# Patient Record
Sex: FEMALE | Race: BLACK OR AFRICAN AMERICAN | Employment: OTHER
[De-identification: names, ages, dates, MRNs, and addresses within clinical notes are randomized per-mention and may not be internally consistent; named-entity substitution may affect disease eponyms.]

---

## 2020-07-15 ENCOUNTER — NURSE TRIAGE (OUTPATIENT)
Dept: OTHER | Facility: CLINIC | Age: 80
End: 2020-07-15

## 2020-07-15 NOTE — TELEPHONE ENCOUNTER
Pt calling for MMO benefit. Caller is pt's son. He states pt's weight yesterday was 122.2, today it is 123.2. Pt then instructs patient that \"you wanted us to watch her weights. \" Pt is trying to reach health , does not want triage. Directed pt to correct MMO place. Please do not respond to the triage nurse through this encounter. Any subsequent communication should be directly with the patient.     Reason for Disposition   Information only question and nurse able to answer    Protocols used: INFORMATION ONLY CALL - NO TRIAGE-ADULT-[place]

## 2023-03-14 ENCOUNTER — NURSING HOME VISIT (OUTPATIENT)
Dept: POST ACUTE CARE | Facility: EXTERNAL LOCATION | Age: 83
End: 2023-03-14
Payer: MEDICARE

## 2023-03-14 DIAGNOSIS — N39.0 URINARY TRACT INFECTION WITHOUT HEMATURIA, SITE UNSPECIFIED: ICD-10-CM

## 2023-03-14 DIAGNOSIS — R53.81 DEBILITY: Primary | ICD-10-CM

## 2023-03-14 DIAGNOSIS — R41.89 COGNITIVE DECLINE: ICD-10-CM

## 2023-03-14 PROCEDURE — G0317 PROLONG NURSING FAC EVAL 15M: HCPCS | Performed by: FAMILY MEDICINE

## 2023-03-14 PROCEDURE — 99310 SBSQ NF CARE HIGH MDM 45: CPT | Performed by: FAMILY MEDICINE

## 2023-03-14 NOTE — LETTER
Patient: Yessi Knight  : 1940    Encounter Date: 2023    Chief Complaint  Follow p on new admission  Subjective  HPI:  82-year-old female with a history of HFpEF, AICD/pacemaker placement, CAD, CKD 3B, CVA and recurrent TIAs admitted to SNF after hospital stay for cognitive and functional decline.  Per hospital record patient presented to the ER due to concern for progressively worsening aphasia.  Patient unable to provide any history however per son who over the phone stated she has had progressively worsening speech over the last couple days.  He was concerned that she may be having a stroke prompting him to call 911.  He reports that she lives with him and that she has very limited mobility and is almost fully dependent for ADLs.  She does have home health care that visits daily.  Per chart review patient's son has a history of disability (details are unclear), he walks with a walker although he is able to prepare food for the patient and himself.  Per discussion with the patient's daughter who is POA she was unaware that the patient was in the ER until she spoke with the ER .  She has been working on getting patient placed in a long-term care facility due to significantly difficulties caring for her at home.  Reportedly patient's home health agencies or family friends were helping out, not a professional home health agency.  A BAT was called in ER due to patient concerns however per neurology evaluation there was no concern for acute stroke and CT head was negative for acute changes.  Due to the patient's significant cognitive and mobility impairments and concern for level of care she is that she is able to receive at home she was admitted for PT OT evaluation and possible placement.  Hospital course SLP consulted for concerns over aspiration/dysphagia. MBSS showed some esophageal deficits resulting in a retrograde flow of all textures.  SLP recommended puréed diet with thin liquids  also recommend double swallowing with every bite/sip.  Nutrition consulted and recommended adding Ensure drinks and pudding for supplements.  While inpatient, patient developed a UTI with RAINA with a creatinine of 1.9, poor urine output and urine lites suggestive intrinsic cause (fena 1.4%).  Due to her dementia, patient was not able to clarify any symptoms except for mild abdominal pain which resolved.  She was treated with IV hydration and started IV ceftriaxone 3/7 through 3/10, while awaiting urine culture results but it showed mixed growth x2 therefore switched to p.o. cefuroxime.  She was seen by family medicine who deemed her suitable for discharge.   3/14  Today patient is lying in bed, looking up at ceiling.  Does not make eye contact.  She did eventually answer to her name, she denied pain.  Per PCC appetite is fair to poor, 25-50%.   Review of Systems:  Reviewed chart looking at current medications, treatment, labs and x-rays and note pertinent positives or negatives, otherwise 10 point system review negative except for what is noted in HPI.  Objective  VS: 169/92, 97,0, 70, 18  Physical exam:  Awake, NAD  Eyes: conjunctiva non-icteric and eye lids are without obvious rash or drooping. Pupils are symmetric.   Ears, Nose, Mouth, and Throat: External ears and nose appear to be without deformity or rash. No lesions or masses noted. Hearing is grossly intact.   CV: RRR, no murmur  Carotids: no bruits  Pulm:CTA B/L  Abd: soft, + BS  LE: no edema  Psychiatric: Alert confused.. Mood and affect flat. + dementia  Assessment/Plan   82-year-old female with a history of HFpEF, AICD/pacemaker placement, CAD, CKD 3B, CVA and recurrent TIAs admitted to SNF after hospital stay for cognitive and functional decline/UTI/budding yeast in urine.  * Physical debility/Cognitive decline, aphasia/dementia- PT/OT consulting and assessing function.   * Dementia- ST/PT/OT  * UTI- on Cefuroxime Axetil ATB and fluconazole  for budding  yeast in urine. Discontinue Cefuroxime on 3/16 and stop date for  today 3/14.    * HTN/CAD/CKD III/HLD- c/w isosorbide, Coreg, hydralazine, ASA, torsemide, atorvastatin.  Monitor labs.  See abnormal labs below.  * Leukopenia- repeat labs next week  *  CVA- right hand contracted  c/w ASA and atorvastatin    Abnormal labs: WBC- 2.66, H&H- 9.6/31.9, BUN- 22/1.35, Ca-10.5      Electronically Signed By: KJ Jones-CNP   3/19/23  5:06 PM

## 2023-03-15 ENCOUNTER — NURSING HOME VISIT (OUTPATIENT)
Dept: POST ACUTE CARE | Facility: EXTERNAL LOCATION | Age: 83
End: 2023-03-15
Payer: MEDICARE

## 2023-03-15 DIAGNOSIS — I50.32 CHF (CONGESTIVE HEART FAILURE), NYHA CLASS II, CHRONIC, DIASTOLIC (MULTI): ICD-10-CM

## 2023-03-15 DIAGNOSIS — R62.7 ADULT FAILURE TO THRIVE: ICD-10-CM

## 2023-03-15 DIAGNOSIS — I69.391 DYSPHAGIA AS LATE EFFECT OF CEREBROVASCULAR ACCIDENT (CVA): ICD-10-CM

## 2023-03-15 DIAGNOSIS — I63.421 CEREBROVASCULAR ACCIDENT (CVA) DUE TO EMBOLISM OF RIGHT ANTERIOR CEREBRAL ARTERY (MULTI): ICD-10-CM

## 2023-03-15 DIAGNOSIS — F33.1 MODERATE EPISODE OF RECURRENT MAJOR DEPRESSIVE DISORDER (MULTI): ICD-10-CM

## 2023-03-15 DIAGNOSIS — I49.5 SINOATRIAL NODE DYSFUNCTION (MULTI): ICD-10-CM

## 2023-03-15 DIAGNOSIS — Z78.9 IMPAIRED MOBILITY AND ADLS: ICD-10-CM

## 2023-03-15 DIAGNOSIS — I69.320 APHASIA AS LATE EFFECT OF CEREBROVASCULAR ACCIDENT: ICD-10-CM

## 2023-03-15 DIAGNOSIS — R41.89 COGNITIVE DEFICIT WITH IMPAIRED PSYCHOMOTOR FUNCTION: ICD-10-CM

## 2023-03-15 DIAGNOSIS — I70.1 RENAL ARTERY STENOSIS (CMS-HCC): ICD-10-CM

## 2023-03-15 DIAGNOSIS — N18.32: ICD-10-CM

## 2023-03-15 DIAGNOSIS — I13.0: ICD-10-CM

## 2023-03-15 DIAGNOSIS — Z74.09 IMPAIRED MOBILITY AND ADLS: ICD-10-CM

## 2023-03-15 DIAGNOSIS — N30.00 ACUTE CYSTITIS WITHOUT HEMATURIA: ICD-10-CM

## 2023-03-15 DIAGNOSIS — G45.9 TIA (TRANSIENT ISCHEMIC ATTACK): Primary | ICD-10-CM

## 2023-03-15 DIAGNOSIS — I50.33: ICD-10-CM

## 2023-03-15 DIAGNOSIS — R41.843 COGNITIVE DEFICIT WITH IMPAIRED PSYCHOMOTOR FUNCTION: ICD-10-CM

## 2023-03-15 PROCEDURE — 99306 1ST NF CARE HIGH MDM 50: CPT | Performed by: FAMILY MEDICINE

## 2023-03-15 NOTE — LETTER
Patient: Scarlett Osborne  : 1940    Encounter Date: 03/15/2023    Chief Complaint/HPI:SCARLETT OSBORNE is a 82 year old Female with a history of HFpEF,   AICD/pacemaker placement, CAD, CKD IIIb, CVA and recurrent TIAs who presented   to the ER due to concern for progressively worsening aphasia. Patient is unable   to provide any historyt she has very limited mobility, is almost fully   dependent for ADLs. She does have home health care that visits daily.      Daughter has been working on getting patient placed in a long term care facility due   to significantly difficulties caring for her at home. Reportedly patient's home   health care are family friends who are helping out, not a professional home   health agency.       A BAT was called in the ER due to aphasia concerns however per neurology   evaluation there was no concern for acute stroke and CT head was negative for   acute changes. Due to patient's significant cognitive and mobility impairments   and concerns about level of care that she is able to receive at home she was   admitted for PT/OT evaluation and possible placement.        SLP consulted for concerns over aspiration/dysphagia. MBSS   showed some esophageal deficits resulting in retrograde flow of all textures,   recommended double swallow with every bite/sip. SLP recommended pureed diet   with thin liquid. Some esophageal deficits resulting in retrograde flow of all   textures, recommended double swallow with every bite/sip. Nutrition consulted,   recommended adding ensure drink and pudding for supplements. While inpatient,   pt developed UTI with RAINA (Cr 1.9) /poor urine output, urine lytes suggestive   of intrinsic cause (FeNA 1.4%), Due to her dementia, pt was not able to clarify   any symptoms except for mild abdo pain which resolved. She was treated with IV   hydration and started IV CTX (3/7-3/10) while awaiting urine cx result but it   showed mixed growth x2, therefore, switched to PO  cefuroxime 250mg BID (3/11-   3/16) for total of 10days. Also added PO diflucan 100mg due to positive UA for   budding yeast for total of 7days (3/8 - 3/14).     Today patient is not able to provide history.          ROS otherwise negative aside from what was mentioned above in HPI.      Patient Active Problem List   Diagnosis   • Moderate episode of recurrent major depressive disorder (CMS/HCC)   • Renal artery stenosis (CMS/HCC)   • Sinoatrial node dysfunction (CMS/HCC)   • Cerebrovascular accident (CVA) due to embolism of right anterior cerebral artery (CMS/HCC)   • Dysphagia as late effect of cerebrovascular accident (CVA)   • TIA (transient ischemic attack)   • Impaired mobility and ADLs   • Acute cystitis without hematuria   • Aphasia as late effect of cerebrovascular accident   • Adult failure to thrive   • Cognitive deficit with impaired psychomotor function   • 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 congestive heart failure and stage 3b chronic kidney disease (CMS/HCC)   • CHF (congestive heart failure), NYHA class II, chronic, diastolic (CMS/HCC)     Past Medical History:   Diagnosis Date   • Chronic kidney disease, stage 3a 07/23/2020    Stage 3a chronic kidney disease   • Chronic rhinitis 01/28/2019    Rhinitis   • Encounter for screening for malignant neoplasm of colon 07/23/2020    Colon cancer screening   • Major depressive disorder, recurrent, unspecified (CMS/HCC) 03/12/2018    Recurrent major depression   • Personal history of benign neoplasm of the brain 03/12/2018    History of benign neoplasm of cerebral meninges   • Personal history of other diseases of the circulatory system 08/20/2019    History of cardiac murmur   • Personal history of other diseases of the digestive system 02/13/2020    History of rectal bleeding   • Personal history of other diseases of the musculoskeletal system and connective tissue 03/12/2018    History of gout   • Personal history of  other diseases of the musculoskeletal system and connective tissue     History of osteopenia   • Personal history of other diseases of the nervous system and sense organs 07/08/2022    History of bacterial conjunctivitis   • Personal history of other diseases of urinary system 03/12/2018    History of renal insufficiency syndrome   • Personal history of other malignant neoplasm of large intestine 03/12/2018    History of malignant neoplasm of colon   • Personal history of other specified conditions 02/15/2022    History of chronic cough   • Personal history of other specified conditions 05/04/2020    History of chronic fatigue   • Personal history of other specified conditions 12/03/2019    History of abnormal weight loss   • Personal history of other specified conditions 02/13/2020    History of abdominal pain   • Presence of dental prosthetic device (complete) (partial) 07/10/2019    Dental root implant present   • Tobacco abuse counseling 01/28/2019    Encounter for smoking cessation counseling   • Unspecified systolic (congestive) heart failure (CMS/HCC) 11/26/2019    Systolic heart failure     Past Surgical History:   Procedure Laterality Date   • OTHER SURGICAL HISTORY  01/28/2019    Colon surgery     No family history on file.         ALLERGIES  Not on File      MEDICATIONS  Current Outpatient Medications   Medication Sig Dispense Refill   • atorvastatin (Lipitor) 80 mg tablet Take 1 tablet (80 mg) by mouth once daily.     • carvedilol (Coreg) 25 mg tablet Take 1 tablet (25 mg) by mouth in the morning and 1 tablet (25 mg) before bedtime.     • folic acid (Folvite) 1 mg tablet Take 1 tablet (1 mg) by mouth once daily.     • hydrALAZINE (Apresoline) 10 mg tablet Take 1 tablet (10 mg) by mouth in the morning and 1 tablet (10 mg) in the evening and 1 tablet (10 mg) before bedtime.     • aspirin 81 mg chewable tablet Chew 1 tablet (81 mg) once daily.     • cholecalciferol (Vitamin D-3) 25 MCG (1000 UT) capsule  Take by mouth.     • cyanocobalamin (Vitamin B-12) 1,000 mcg tablet Take 1 tablet (1,000 mcg) by mouth once daily.     • iron polysaccharide-B12-folic acid (Poly-Iron 150 Forte) 150-25-1 mg-mcg-mg capsule Take by mouth.     • sodium bicarbonate 650 mg tablet Take by mouth.       No current facility-administered medications for this visit.         PHYSICAL EXAM  There were no vitals taken for this visit.  .FLOWAMB[11   .FLOWAMB[14   There is no height or weight on file to calculate BMI.  Gen: Alert to self , NAD  HEENT:  PERRLA, EOMI, conjunctiva and sclera normal in appearance  Respiratory:  Lungs CTAB  Cardiovascular:  AICD No M/R/G  Neuro:  Gross motor and sensory intact  Skin:  No suspicious lesions present    ASSESSMENT/PLAN  Problem List Items Addressed This Visit          Nervous    Cerebrovascular accident (CVA) due to embolism of right anterior cerebral artery (CMS/HCC)    Overview     H/o worsening. C/w statin and asa.          Current Assessment & Plan     Continiue ASA Statin BP control supportive care Worsening Aphasia and dysphagia         Aphasia as late effect of cerebrovascular accident    Current Assessment & Plan     ST consult            Circulatory    Renal artery stenosis (CMS/HCC)    Current Assessment & Plan     Hydralazine PRN for uncontrolled HTN         Sinoatrial node dysfunction (CMS/HCC)    Current Assessment & Plan     AICD in place/ follow up with cardiology         Relevant Medications    carvedilol (Coreg) 25 mg tablet    TIA (transient ischemic attack) - Primary    Current Assessment & Plan     C/w ASA statin. No new stroke on CT . 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 Weekly labs.          Relevant Medications    carvedilol (Coreg) 25 mg tablet    CHF (congestive heart failure), NYHA  class II, chronic, diastolic (CMS/HCC)    Current Assessment & Plan     C/w Control BP. Monitor weight weekly labs         Relevant Medications    carvedilol (Coreg) 25 mg tablet       Digestive    Dysphagia as late effect of cerebrovascular accident (CVA)    Current Assessment & Plan     ST consult            Genitourinary    Acute cystitis without hematuria    Current Assessment & Plan     Complete ATB as ordered            Endocrine/Metabolic    Adult failure to thrive    Current Assessment & Plan     Appetite stimulant may be needed as per nutritionist assessment             Other    Moderate episode of recurrent major depressive disorder (CMS/HCC)    Current Assessment & Plan     Currently not on meds.Evaluate over the next few days. Consult psych and start Meds if needed         Impaired mobility and ADLs    Current Assessment & Plan     OT/PT/ST consult moderate intesity therapy 6 times a week.          Cognitive deficit with impaired psychomotor function    Current Assessment & Plan     Supportive care                Vik Montoya MD      Electronically Signed By: Vik Montoya MD   3/23/23  4:06 PM

## 2023-03-19 NOTE — PROGRESS NOTES
Chief Complaint  Follow p on new admission  Subjective  HPI:  82-year-old female with a history of HFpEF, AICD/pacemaker placement, CAD, CKD 3B, CVA and recurrent TIAs admitted to SNF after hospital stay for cognitive and functional decline.  Per hospital record patient presented to the ER due to concern for progressively worsening aphasia.  Patient unable to provide any history however per son who over the phone stated she has had progressively worsening speech over the last couple days.  He was concerned that she may be having a stroke prompting him to call 911.  He reports that she lives with him and that she has very limited mobility and is almost fully dependent for ADLs.  She does have home health care that visits daily.  Per chart review patient's son has a history of disability (details are unclear), he walks with a walker although he is able to prepare food for the patient and himself.  Per discussion with the patient's daughter who is POA she was unaware that the patient was in the ER until she spoke with the ER .  She has been working on getting patient placed in a long-term care facility due to significantly difficulties caring for her at home.  Reportedly patient's home health agencies or family friends were helping out, not a professional home health agency.  A BAT was called in ER due to patient concerns however per neurology evaluation there was no concern for acute stroke and CT head was negative for acute changes.  Due to the patient's significant cognitive and mobility impairments and concern for level of care she is that she is able to receive at home she was admitted for PT OT evaluation and possible placement.  Hospital course SLP consulted for concerns over aspiration/dysphagia. MBSS showed some esophageal deficits resulting in a retrograde flow of all textures.  SLP recommended puréed diet with thin liquids also recommend double swallowing with every bite/sip.  Nutrition  consulted and recommended adding Ensure drinks and pudding for supplements.  While inpatient, patient developed a UTI with RAINA with a creatinine of 1.9, poor urine output and urine lites suggestive intrinsic cause (fena 1.4%).  Due to her dementia, patient was not able to clarify any symptoms except for mild abdominal pain which resolved.  She was treated with IV hydration and started IV ceftriaxone 3/7 through 3/10, while awaiting urine culture results but it showed mixed growth x2 therefore switched to p.o. cefuroxime.  She was seen by family medicine who deemed her suitable for discharge.   3/14  Today patient is lying in bed, looking up at ceiling.  Does not make eye contact.  She did eventually answer to her name, she denied pain.  Per PCC appetite is fair to poor, 25-50%.   Review of Systems:  Reviewed chart looking at current medications, treatment, labs and x-rays and note pertinent positives or negatives, otherwise 10 point system review negative except for what is noted in HPI.  Objective  VS: 169/92, 97,0, 70, 18  Physical exam:  Awake, NAD  Eyes: conjunctiva non-icteric and eye lids are without obvious rash or drooping. Pupils are symmetric.   Ears, Nose, Mouth, and Throat: External ears and nose appear to be without deformity or rash. No lesions or masses noted. Hearing is grossly intact.   CV: RRR, no murmur  Carotids: no bruits  Pulm:CTA B/L  Abd: soft, + BS  LE: no edema  Psychiatric: Alert confused.. Mood and affect flat. + dementia  Assessment/Plan   82-year-old female with a history of HFpEF, AICD/pacemaker placement, CAD, CKD 3B, CVA and recurrent TIAs admitted to SNF after hospital stay for cognitive and functional decline/UTI/budding yeast in urine.  * Physical debility/Cognitive decline, aphasia/dementia- PT/OT consulting and assessing function.   * Dementia- ST/PT/OT  * UTI- on Cefuroxime Axetil ATB and fluconazole  for budding yeast in urine. Discontinue Cefuroxime on 3/16 and stop date for   today 3/14.    * HTN/CAD/CKD III/HLD- c/w isosorbide, Coreg, hydralazine, ASA, torsemide, atorvastatin.  Monitor labs.  See abnormal labs below.  * Leukopenia- repeat labs next week  *  CVA- right hand contracted  c/w ASA and atorvastatin    Abnormal labs: WBC- 2.66, H&H- 9.6/31.9, BUN- 22/1.35, Ca-10.5

## 2023-03-21 ENCOUNTER — NURSING HOME VISIT (OUTPATIENT)
Dept: POST ACUTE CARE | Facility: EXTERNAL LOCATION | Age: 83
End: 2023-03-21
Payer: MEDICARE

## 2023-03-21 DIAGNOSIS — R41.843 COGNITIVE DEFICIT WITH IMPAIRED PSYCHOMOTOR FUNCTION: ICD-10-CM

## 2023-03-21 DIAGNOSIS — R53.81 DEBILITY: Primary | ICD-10-CM

## 2023-03-21 DIAGNOSIS — R41.89 COGNITIVE DECLINE: ICD-10-CM

## 2023-03-21 DIAGNOSIS — R41.89 COGNITIVE DEFICIT WITH IMPAIRED PSYCHOMOTOR FUNCTION: ICD-10-CM

## 2023-03-21 DIAGNOSIS — R62.7 ADULT FAILURE TO THRIVE: ICD-10-CM

## 2023-03-21 DIAGNOSIS — G45.9 TIA (TRANSIENT ISCHEMIC ATTACK): ICD-10-CM

## 2023-03-21 PROCEDURE — 99309 SBSQ NF CARE MODERATE MDM 30: CPT | Performed by: FAMILY MEDICINE

## 2023-03-21 NOTE — LETTER
Patient: Yessi Knight  : 1940    Encounter Date: 2023    Nursing Home Visit  Name: Yessi Knight  YOB: 1940  MRN: [id number]    Chief Complaint  Lab Review  Subjective  HPI:   82-year-old female with a history of HFpEF, AICD/pacemaker placement, CAD, CKD 3B, CVA and recurrent TIAs admitted to SNF after hospital stay for cognitive and functional decline.  Per hospital record patient presented to the ER due to concern for progressively worsening aphasia.  Patient unable to provide any history however per son who over the phone stated she has had progressively worsening speech over the last couple days.  He was concerned that she may be having a stroke prompting him to call 911.  He reports that she lives with him and that she has very limited mobility and is almost fully dependent for ADLs.  She does have home health care that visits daily.  Per chart review patient's son has a history of disability (details are unclear), he walks with a walker although he is able to prepare food for the patient and himself.  Per discussion with the patient's daughter who is POA she was unaware that the patient was in the ER until she spoke with the ER .  She has been working on getting patient placed in a long-term care facility due to significantly difficulties caring for her at home.  Reportedly patient's home health agencies or family friends were helping out, not a professional home health agency.  A BAT was called in ER due to patient concerns however per neurology evaluation there was no concern for acute stroke and CT head was negative for acute changes.  Due to the patient's significant cognitive and mobility impairments and concern for level of care she is that she is able to receive at home she was admitted for PT OT evaluation and possible placement.  Hospital course SLP consulted for concerns over aspiration/dysphagia. MBSS showed some esophageal deficits resulting in a  retrograde flow of all textures.  SLP recommended puréed diet with thin liquids also recommend double swallowing with every bite/sip.  Nutrition consulted and recommended adding Ensure drinks and pudding for supplements.  While inpatient, patient developed a UTI with RAINA with a creatinine of 1.9, poor urine output and urine lites suggestive intrinsic cause (fena 1.4%).  Due to her dementia, patient was not able to clarify any symptoms except for mild abdominal pain which resolved.  She was treated with IV hydration and started IV ceftriaxone 3/7 through 3/10, while awaiting urine culture results but it showed mixed growth x2 therefore switched to p.o. cefuroxime.  She was seen by family medicine who deemed her suitable for discharge.   3/14  Today patient is lying in bed, looking up at ceiling.  Does not make eye contact.  She did eventually answer to her name, she denied pain.  Per PCC appetite is fair to poor, 25-50%.   3/21  Patient sitting on side of bed for breakfast.  She still syed her feet on the floor, upper 1/2 of her body is laying across the bed.  Is not speaking at this moment.   Review of Systems:  Reviewed chart looking at current medications, treatment, labs and x-rays and note pertinent positives or negatives, otherwise 10 point system review negative except for what is noted in HPI.    Objective  VS: /76   Pulse 72   Temp 36.6 °C (97.8 °F)   Resp 18   Wt 49.9 kg (110 lb)   SpO2 95%   BMI 22.22 kg/m²     Physical exam:   Physical Exam   Awake, NAD  Eyes: conjunctiva non-icteric and eye lids are without obvious rash or drooping. Pupils are symmetric.   Ears, Nose, Mouth, and Throat: External ears and nose appear to be without deformity or rash. No lesions or masses noted. Hearing is grossly intact.   CV: RRR, no murmur  Carotids: no bruits  Pulm:CTA B/L  Abd: soft, + BS  LE: no edema  Psychiatric: Alert confused.. Mood and affect flat. + dementia  Assessment/Plan   82-year-old female with  a history of HFpEF, AICD/pacemaker placement, CAD, CKD 3B, CVA and recurrent TIAs admitted to SNF after hospital stay for cognitive and functional decline/UTI/budding yeast in urine.  * Physical debility/Cognitive decline, aphasia/dementia- PT/OT consulting and assessing function.   * Dementia/Cognitive decline/ failure to thrive- ST/PT/OT  * UTI- ATB and antifungal completed  * HTN/CAD/CKD III/HLD- Cr is trending up, monitor, encourage fluids, c/w isosorbide, Coreg, hydralazine, ASA, torsemide, atorvastatin.  Monitor labs.  See abnormal labs below.  * Leukopenia- repeat labs next week  *  TIA/CVA hx- right hand contracted  c/w ASA and atorvastatin  Abnormal labs:  Ca+- 10.8, Chl- 108, BUN- 26 Cr- 1.49, RBCs- 3.54, HGB- 9.7, HCT 33.3, WBCs- 3.05,       Electronically Signed By: KJ Jones-CNP   3/27/23  4:55 PM

## 2023-03-23 ENCOUNTER — NURSING HOME VISIT (OUTPATIENT)
Dept: POST ACUTE CARE | Facility: EXTERNAL LOCATION | Age: 83
End: 2023-03-23
Payer: MEDICARE

## 2023-03-23 VITALS — DIASTOLIC BLOOD PRESSURE: 78 MMHG | HEART RATE: 64 BPM | RESPIRATION RATE: 16 BRPM | SYSTOLIC BLOOD PRESSURE: 154 MMHG

## 2023-03-23 DIAGNOSIS — R41.89 COGNITIVE DECLINE: ICD-10-CM

## 2023-03-23 DIAGNOSIS — I49.5 SINOATRIAL NODE DYSFUNCTION (MULTI): ICD-10-CM

## 2023-03-23 DIAGNOSIS — I69.391 DYSPHAGIA AS LATE EFFECT OF CEREBROVASCULAR ACCIDENT (CVA): ICD-10-CM

## 2023-03-23 DIAGNOSIS — E53.8 VITAMIN B12 DEFICIENCY: ICD-10-CM

## 2023-03-23 DIAGNOSIS — G45.9 TIA (TRANSIENT ISCHEMIC ATTACK): ICD-10-CM

## 2023-03-23 DIAGNOSIS — I50.32 CHF (CONGESTIVE HEART FAILURE), NYHA CLASS II, CHRONIC, DIASTOLIC (MULTI): ICD-10-CM

## 2023-03-23 DIAGNOSIS — E53.8 FOLIC ACID DEFICIENCY: ICD-10-CM

## 2023-03-23 DIAGNOSIS — N18.4 STAGE 4 CHRONIC KIDNEY DISEASE (MULTI): ICD-10-CM

## 2023-03-23 DIAGNOSIS — R62.7 FAILURE TO THRIVE IN ADULT: ICD-10-CM

## 2023-03-23 DIAGNOSIS — R62.7 ADULT FAILURE TO THRIVE: Primary | ICD-10-CM

## 2023-03-23 DIAGNOSIS — I50.22 CHRONIC SYSTOLIC HEART FAILURE (MULTI): ICD-10-CM

## 2023-03-23 DIAGNOSIS — I63.421 CEREBROVASCULAR ACCIDENT (CVA) DUE TO EMBOLISM OF RIGHT ANTERIOR CEREBRAL ARTERY (MULTI): ICD-10-CM

## 2023-03-23 DIAGNOSIS — D50.9 IRON DEFICIENCY ANEMIA, UNSPECIFIED IRON DEFICIENCY ANEMIA TYPE: ICD-10-CM

## 2023-03-23 DIAGNOSIS — E55.9 VITAMIN D DEFICIENCY: ICD-10-CM

## 2023-03-23 DIAGNOSIS — E78.5 HYPERLIPIDEMIA, UNSPECIFIED HYPERLIPIDEMIA TYPE: ICD-10-CM

## 2023-03-23 DIAGNOSIS — I22.2 SUBSEQUENT NON-ST ELEVATION (NSTEMI) MYOCARDIAL INFARCTION (MULTI): ICD-10-CM

## 2023-03-23 DIAGNOSIS — I69.320 APHASIA AS LATE EFFECT OF CEREBROVASCULAR ACCIDENT: ICD-10-CM

## 2023-03-23 PROBLEM — Z78.9 IMPAIRED MOBILITY AND ADLS: Status: ACTIVE | Noted: 2023-03-23

## 2023-03-23 PROBLEM — I70.1 RENAL ARTERY STENOSIS (CMS-HCC): Status: ACTIVE | Noted: 2023-03-23

## 2023-03-23 PROBLEM — I13.0: Status: ACTIVE | Noted: 2023-03-23

## 2023-03-23 PROBLEM — I50.33: Status: ACTIVE | Noted: 2023-03-23

## 2023-03-23 PROBLEM — N30.00 ACUTE CYSTITIS WITHOUT HEMATURIA: Status: ACTIVE | Noted: 2023-03-23

## 2023-03-23 PROBLEM — R41.843 COGNITIVE DEFICIT WITH IMPAIRED PSYCHOMOTOR FUNCTION: Status: ACTIVE | Noted: 2023-03-23

## 2023-03-23 PROBLEM — N18.32: Status: ACTIVE | Noted: 2023-03-23

## 2023-03-23 PROBLEM — Z74.09 IMPAIRED MOBILITY AND ADLS: Status: ACTIVE | Noted: 2023-03-23

## 2023-03-23 PROBLEM — F33.1 MODERATE EPISODE OF RECURRENT MAJOR DEPRESSIVE DISORDER (MULTI): Status: ACTIVE | Noted: 2023-03-23

## 2023-03-23 PROCEDURE — 99309 SBSQ NF CARE MODERATE MDM 30: CPT | Performed by: FAMILY MEDICINE

## 2023-03-23 RX ORDER — HYDRALAZINE HYDROCHLORIDE 10 MG/1
1 TABLET, FILM COATED ORAL 3 TIMES DAILY
COMMUNITY
Start: 2020-10-15

## 2023-03-23 RX ORDER — SODIUM BICARBONATE 650 MG/1
TABLET ORAL
COMMUNITY

## 2023-03-23 RX ORDER — NAPROXEN SODIUM 220 MG/1
1 TABLET, FILM COATED ORAL DAILY
COMMUNITY
End: 2023-04-01 | Stop reason: SDUPTHER

## 2023-03-23 RX ORDER — CARVEDILOL 25 MG/1
1 TABLET ORAL 2 TIMES DAILY
COMMUNITY
Start: 2019-10-31 | End: 2023-04-01 | Stop reason: SDUPTHER

## 2023-03-23 RX ORDER — FOLIC ACID 1 MG/1
1 TABLET ORAL DAILY
COMMUNITY
Start: 2019-07-19

## 2023-03-23 RX ORDER — LANOLIN ALCOHOL/MO/W.PET/CERES
1 CREAM (GRAM) TOPICAL DAILY
COMMUNITY
End: 2023-04-01 | Stop reason: SDUPTHER

## 2023-03-23 RX ORDER — VIT C/E/ZN/COPPR/LUTEIN/ZEAXAN 250MG-90MG
CAPSULE ORAL
COMMUNITY
End: 2023-04-01 | Stop reason: SDUPTHER

## 2023-03-23 RX ORDER — IRON PS COMPLEX/B12/FOLIC ACID 150-25-1
CAPSULE ORAL
COMMUNITY

## 2023-03-23 RX ORDER — ATORVASTATIN CALCIUM 80 MG/1
1 TABLET, FILM COATED ORAL DAILY
COMMUNITY
Start: 2021-06-02 | End: 2023-07-19

## 2023-03-23 NOTE — PROGRESS NOTES
Chief Complaint/HPI:SCARLETT OSBORNE is a 82 year old Female with a history of HFpEF,   AICD/pacemaker placement, CAD, CKD IIIb, CVA and recurrent TIAs who presented   to the ER due to concern for progressively worsening aphasia. Patient is unable   to provide any historyt she has very limited mobility, is almost fully   dependent for ADLs. She does have home health care that visits daily.      Daughter has been working on getting patient placed in a long term care facility due   to significantly difficulties caring for her at home. Reportedly patient's home   health care are family friends who are helping out, not a professional home   health agency.       A BAT was called in the ER due to aphasia concerns however per neurology   evaluation there was no concern for acute stroke and CT head was negative for   acute changes. Due to patient's significant cognitive and mobility impairments   and concerns about level of care that she is able to receive at home she was   admitted for PT/OT evaluation and possible placement.        SLP consulted for concerns over aspiration/dysphagia. MBSS   showed some esophageal deficits resulting in retrograde flow of all textures,   recommended double swallow with every bite/sip. SLP recommended pureed diet   with thin liquid. Some esophageal deficits resulting in retrograde flow of all   textures, recommended double swallow with every bite/sip. Nutrition consulted,   recommended adding ensure drink and pudding for supplements. While inpatient,   pt developed UTI with RAINA (Cr 1.9) /poor urine output, urine lytes suggestive   of intrinsic cause (FeNA 1.4%), Due to her dementia, pt was not able to clarify   any symptoms except for mild abdo pain which resolved. She was treated with IV   hydration and started IV CTX (3/7-3/10) while awaiting urine cx result but it   showed mixed growth x2, therefore, switched to PO cefuroxime 250mg BID (3/11-   3/16) for total of 10days. Also added PO  diflucan 100mg due to positive UA for   budding yeast for total of 7days (3/8 - 3/14).     Today patient is not able to provide history.          ROS otherwise negative aside from what was mentioned above in HPI.      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 and stage 3b chronic kidney disease (CMS/HCC)    CHF (congestive heart failure), NYHA class II, chronic, diastolic (CMS/HCC)     Past Medical History:   Diagnosis Date    Chronic kidney disease, stage 3a 07/23/2020    Stage 3a chronic kidney disease    Chronic rhinitis 01/28/2019    Rhinitis    Encounter for screening for malignant neoplasm of colon 07/23/2020    Colon cancer screening    Major depressive disorder, recurrent, unspecified (CMS/HCC) 03/12/2018    Recurrent major depression    Personal history of benign neoplasm of the brain 03/12/2018    History of benign neoplasm of cerebral meninges    Personal history of other diseases of the circulatory system 08/20/2019    History of cardiac murmur    Personal history of other diseases of the digestive system 02/13/2020    History of rectal bleeding    Personal history of other diseases of the musculoskeletal system and connective tissue 03/12/2018    History of gout    Personal history of other diseases of the musculoskeletal system and connective tissue     History of osteopenia     Personal history of other diseases of the nervous system and sense organs 07/08/2022    History of bacterial conjunctivitis    Personal history of other diseases of urinary system 03/12/2018    History of renal insufficiency syndrome    Personal history of other malignant neoplasm of large intestine 03/12/2018    History of malignant neoplasm of colon    Personal history of other specified conditions 02/15/2022    History of chronic cough    Personal history of other specified conditions 05/04/2020    History of chronic fatigue    Personal history of other specified conditions 12/03/2019    History of abnormal weight loss    Personal history of other specified conditions 02/13/2020    History of abdominal pain    Presence of dental prosthetic device (complete) (partial) 07/10/2019    Dental root implant present    Tobacco abuse counseling 01/28/2019    Encounter for smoking cessation counseling    Unspecified systolic (congestive) heart failure (CMS/HCC) 11/26/2019    Systolic heart failure     Past Surgical History:   Procedure Laterality Date    OTHER SURGICAL HISTORY  01/28/2019    Colon surgery     No family history on file.         ALLERGIES  Not on File      MEDICATIONS  Current Outpatient Medications   Medication Sig Dispense Refill    atorvastatin (Lipitor) 80 mg tablet Take 1 tablet (80 mg) by mouth once daily.      carvedilol (Coreg) 25 mg tablet Take 1 tablet (25 mg) by mouth in the morning and 1 tablet (25 mg) before bedtime.      folic acid (Folvite) 1 mg tablet Take 1 tablet (1 mg) by mouth once daily.      hydrALAZINE (Apresoline) 10 mg tablet Take 1 tablet (10 mg) by mouth in the morning and 1 tablet (10 mg) in the evening and 1 tablet (10 mg) before bedtime.      aspirin 81 mg chewable tablet Chew 1 tablet (81 mg) once daily.      cholecalciferol (Vitamin D-3) 25 MCG (1000 UT) capsule Take by mouth.      cyanocobalamin (Vitamin B-12) 1,000 mcg tablet Take 1 tablet (1,000 mcg) by mouth once daily.       iron polysaccharide-B12-folic acid (Poly-Iron 150 Forte) 150-25-1 mg-mcg-mg capsule Take by mouth.      sodium bicarbonate 650 mg tablet Take by mouth.       No current facility-administered medications for this visit.         PHYSICAL EXAM  There were no vitals taken for this visit.  .FLOWAMB[11   .FLOWAMB[14   There is no height or weight on file to calculate BMI.  Gen: Alert to self , NAD  HEENT:  PERRLA, EOMI, conjunctiva and sclera normal in appearance  Respiratory:  Lungs CTAB  Cardiovascular:  AICD No M/R/G  Neuro:  Gross motor and sensory intact  Skin:  No suspicious lesions present    ASSESSMENT/PLAN  Problem List Items Addressed This Visit          Nervous    Cerebrovascular accident (CVA) due to embolism of right anterior cerebral artery (CMS/HCC)    Overview     H/o worsening. C/w statin and asa.          Current Assessment & Plan     Continiue ASA Statin BP control supportive care Worsening Aphasia and dysphagia         Aphasia as late effect of cerebrovascular accident    Current Assessment & Plan     ST consult            Circulatory    Renal artery stenosis (CMS/HCC)    Current Assessment & Plan     Hydralazine PRN for uncontrolled HTN         Sinoatrial node dysfunction (CMS/HCC)    Current Assessment & Plan     AICD in place/ follow up with cardiology         Relevant Medications    carvedilol (Coreg) 25 mg tablet    TIA (transient ischemic attack) - Primary    Current Assessment & Plan     C/w ASA statin. No new stroke on CT . 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 Weekly labs.          Relevant Medications    carvedilol (Coreg) 25 mg tablet    CHF (congestive heart failure), NYHA class II, chronic, diastolic (CMS/HCC)    Current Assessment & Plan     C/w Control BP. Monitor weight weekly labs          Relevant Medications    carvedilol (Coreg) 25 mg tablet       Digestive    Dysphagia as late effect of cerebrovascular accident (CVA)    Current Assessment & Plan     ST consult            Genitourinary    Acute cystitis without hematuria    Current Assessment & Plan     Complete ATB as ordered            Endocrine/Metabolic    Adult failure to thrive    Current Assessment & Plan     Appetite stimulant may be needed as per nutritionist assessment             Other    Moderate episode of recurrent major depressive disorder (CMS/HCC)    Current Assessment & Plan     Currently not on meds.Evaluate over the next few days. Consult psych and start Meds if needed         Impaired mobility and ADLs    Current Assessment & Plan     OT/PT/ST consult moderate intesity therapy 6 times a week.          Cognitive deficit with impaired psychomotor function    Current Assessment & Plan     Supportive care                Vik Montoya MD

## 2023-03-23 NOTE — LETTER
Patient: Yessi Knight  : 1940    Encounter Date: 2023    Nursing Home Visit  Name: Yessi Knight  YOB: 1940  MRN: [id number]    Chief Complaint  Failure to thrive/ decreased appetite  Subjective  HPI:   82-year-old female with a history of HFpEF, AICD/pacemaker placement, CAD, CKD 3B, CVA and recurrent TIAs admitted to SNF after hospital stay for cognitive and functional decline.  Per hospital record patient presented to the ER due to concern for progressively worsening aphasia.  Patient unable to provide any history however per son who over the phone stated she has had progressively worsening speech over the last couple days.  He was concerned that she may be having a stroke prompting him to call 911.  He reports that she lives with him and that she has very limited mobility and is almost fully dependent for ADLs.  She does have home health care that visits daily.  Per chart review patient's son has a history of disability (details are unclear), he walks with a walker although he is able to prepare food for the patient and himself.  Per discussion with the patient's daughter who is POA she was unaware that the patient was in the ER until she spoke with the ER .  She has been working on getting patient placed in a long-term care facility due to significantly difficulties caring for her at home.  Reportedly patient's home health agencies or family friends were helping out, not a professional home health agency.  A BAT was called in ER due to patient concerns however per neurology evaluation there was no concern for acute stroke and CT head was negative for acute changes.  Due to the patient's significant cognitive and mobility impairments and concern for level of care she is that she is able to receive at home she was admitted for PT OT evaluation and possible placement.  Hospital course SLP consulted for concerns over aspiration/dysphagia. MBSS showed some esophageal  deficits resulting in a retrograde flow of all textures.  SLP recommended puréed diet with thin liquids also recommend double swallowing with every bite/sip.  Nutrition consulted and recommended adding Ensure drinks and pudding for supplements.  While inpatient, patient developed a UTI with RAINA with a creatinine of 1.9, poor urine output and urine lites suggestive intrinsic cause (fena 1.4%).  Due to her dementia, patient was not able to clarify any symptoms except for mild abdominal pain which resolved.  She was treated with IV hydration and started IV ceftriaxone 3/7 through 3/10, while awaiting urine culture results but it showed mixed growth x2 therefore switched to p.o. cefuroxime.  She was seen by family medicine who deemed her suitable for discharge.   3/14  Today patient is lying in bed, looking up at ceiling.  Does not make eye contact.  She did eventually answer to her name, she denied pain.  Per PCC appetite is fair to poor, 25-50%.   3/21  Patient sitting on side of bed for breakfast.  She still syed her feet on the floor, upper 1/2 of her body is laying across the bed.  Is not speaking at this moment.   3/23 Patient looks better today, is more alert, she does answer some questions.  Appetite variable, request from family for appetite stimulant   Review of Systems:  Reviewed chart looking at current medications, treatment, labs and x-rays and note pertinent positives or negatives, otherwise 10 point system review negative except for what is noted in HPI.  Objective  VS: /84   Pulse 72   Temp 36.6 °C (97.8 °F)   Resp 18   Wt 50.8 kg (112 lb)   SpO2 95%   BMI 22.62 kg/m²     Physical exam:   Physical Exam  HENT:      Head: Normocephalic.      Mouth/Throat:      Mouth: Mucous membranes are moist.   Cardiovascular:      Rate and Rhythm: Normal rate and regular rhythm.      Heart sounds: Murmur heard.   Pulmonary:      Effort: Pulmonary effort is normal.      Breath sounds: Normal breath sounds.    Abdominal:      General: Bowel sounds are normal. There is no distension.      Palpations: Abdomen is soft.      Tenderness: There is no abdominal tenderness.   Musculoskeletal:      Right lower leg: No edema.      Left lower leg: No edema.   Skin:     General: Skin is warm and dry.   Neurological:      Mental Status: She is alert. Mental status is at baseline.        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

## 2023-03-27 VITALS
TEMPERATURE: 97.8 F | SYSTOLIC BLOOD PRESSURE: 128 MMHG | DIASTOLIC BLOOD PRESSURE: 76 MMHG | OXYGEN SATURATION: 95 % | RESPIRATION RATE: 18 BRPM | BODY MASS INDEX: 22.22 KG/M2 | HEART RATE: 72 BPM | WEIGHT: 110 LBS

## 2023-03-27 NOTE — PROGRESS NOTES
Nursing Home Visit  Name: Yessi Knight  YOB: 1940  MRN: [id number]    Chief Complaint  Lab Review  Subjective  HPI:   82-year-old female with a history of HFpEF, AICD/pacemaker placement, CAD, CKD 3B, CVA and recurrent TIAs admitted to SNF after hospital stay for cognitive and functional decline.  Per hospital record patient presented to the ER due to concern for progressively worsening aphasia.  Patient unable to provide any history however per son who over the phone stated she has had progressively worsening speech over the last couple days.  He was concerned that she may be having a stroke prompting him to call 911.  He reports that she lives with him and that she has very limited mobility and is almost fully dependent for ADLs.  She does have home health care that visits daily.  Per chart review patient's son has a history of disability (details are unclear), he walks with a walker although he is able to prepare food for the patient and himself.  Per discussion with the patient's daughter who is POA she was unaware that the patient was in the ER until she spoke with the ER .  She has been working on getting patient placed in a long-term care facility due to significantly difficulties caring for her at home.  Reportedly patient's home health agencies or family friends were helping out, not a professional home health agency.  A BAT was called in ER due to patient concerns however per neurology evaluation there was no concern for acute stroke and CT head was negative for acute changes.  Due to the patient's significant cognitive and mobility impairments and concern for level of care she is that she is able to receive at home she was admitted for PT OT evaluation and possible placement.  Hospital course SLP consulted for concerns over aspiration/dysphagia. MBSS showed some esophageal deficits resulting in a retrograde flow of all textures.  SLP recommended puréed diet with thin liquids  also recommend double swallowing with every bite/sip.  Nutrition consulted and recommended adding Ensure drinks and pudding for supplements.  While inpatient, patient developed a UTI with RAINA with a creatinine of 1.9, poor urine output and urine lites suggestive intrinsic cause (fena 1.4%).  Due to her dementia, patient was not able to clarify any symptoms except for mild abdominal pain which resolved.  She was treated with IV hydration and started IV ceftriaxone 3/7 through 3/10, while awaiting urine culture results but it showed mixed growth x2 therefore switched to p.o. cefuroxime.  She was seen by family medicine who deemed her suitable for discharge.   3/14  Today patient is lying in bed, looking up at ceiling.  Does not make eye contact.  She did eventually answer to her name, she denied pain.  Per PCC appetite is fair to poor, 25-50%.   3/21  Patient sitting on side of bed for breakfast.  She still syed her feet on the floor, upper 1/2 of her body is laying across the bed.  Is not speaking at this moment.   Review of Systems:  Reviewed chart looking at current medications, treatment, labs and x-rays and note pertinent positives or negatives, otherwise 10 point system review negative except for what is noted in HPI.    Objective  VS: /76   Pulse 72   Temp 36.6 °C (97.8 °F)   Resp 18   Wt 49.9 kg (110 lb)   SpO2 95%   BMI 22.22 kg/m²     Physical exam:   Physical Exam   Awake, NAD  Eyes: conjunctiva non-icteric and eye lids are without obvious rash or drooping. Pupils are symmetric.   Ears, Nose, Mouth, and Throat: External ears and nose appear to be without deformity or rash. No lesions or masses noted. Hearing is grossly intact.   CV: RRR, no murmur  Carotids: no bruits  Pulm:CTA B/L  Abd: soft, + BS  LE: no edema  Psychiatric: Alert confused.. Mood and affect flat. + dementia  Assessment/Plan   82-year-old female with a history of HFpEF, AICD/pacemaker placement, CAD, CKD 3B, CVA and recurrent  TIAs admitted to SNF after hospital stay for cognitive and functional decline/UTI/budding yeast in urine.  * Physical debility/Cognitive decline, aphasia/dementia- PT/OT consulting and assessing function.   * Dementia/Cognitive decline/ failure to thrive- ST/PT/OT  * UTI- ATB and antifungal completed  * HTN/CAD/CKD III/HLD- Cr is trending up, monitor, encourage fluids, c/w isosorbide, Coreg, hydralazine, ASA, torsemide, atorvastatin.  Monitor labs.  See abnormal labs below.  * Leukopenia- repeat labs next week  *  TIA/CVA hx- right hand contracted  c/w ASA and atorvastatin  Abnormal labs:  Ca+- 10.8, Chl- 108, BUN- 26 Cr- 1.49, RBCs- 3.54, HGB- 9.7, HCT 33.3, WBCs- 3.05,

## 2023-03-30 ENCOUNTER — NURSING HOME VISIT (OUTPATIENT)
Dept: POST ACUTE CARE | Facility: EXTERNAL LOCATION | Age: 83
End: 2023-03-30
Payer: MEDICARE

## 2023-03-30 DIAGNOSIS — I49.5 SINOATRIAL NODE DYSFUNCTION (MULTI): ICD-10-CM

## 2023-03-30 DIAGNOSIS — I70.0 ARTERIOSCLEROSIS OF AORTA (CMS-HCC): Primary | ICD-10-CM

## 2023-03-30 DIAGNOSIS — I63.421 CEREBROVASCULAR ACCIDENT (CVA) DUE TO EMBOLISM OF RIGHT ANTERIOR CEREBRAL ARTERY (MULTI): ICD-10-CM

## 2023-03-30 PROCEDURE — 99309 SBSQ NF CARE MODERATE MDM 30: CPT | Performed by: FAMILY MEDICINE

## 2023-03-30 NOTE — LETTER
Patient: Yessi Knight  : 1940    Encounter Date: 2023    Nursing Home Visit  Name: Yessi Knight  YOB: 1940  MRN: [id number]    Chief Complaint  Failure to thrive/ decreased appetite  Subjective  HPI:   82-year-old female with a history of HFpEF, AICD/pacemaker placement, CAD, CKD 3B, CVA and recurrent TIAs admitted to SNF after hospital stay for cognitive and functional decline.  Per hospital record patient presented to the ER due to concern for progressively worsening aphasia.  Patient unable to provide any history however per son who over the phone stated she has had progressively worsening speech over the last couple days.  He was concerned that she may be having a stroke prompting him to call 911.  He reports that she lives with him and that she has very limited mobility and is almost fully dependent for ADLs.  She does have home health care that visits daily.  Per chart review patient's son has a history of disability (details are unclear), he walks with a walker although he is able to prepare food for the patient and himself.  Per discussion with the patient's daughter who is POA she was unaware that the patient was in the ER until she spoke with the ER .  She has been working on getting patient placed in a long-term care facility due to significantly difficulties caring for her at home.  Reportedly patient's home health agencies or family friends were helping out, not a professional home health agency.  A BAT was called in ER due to patient concerns however per neurology evaluation there was no concern for acute stroke and CT head was negative for acute changes.  Due to the patient's significant cognitive and mobility impairments and concern for level of care she is that she is able to receive at home she was admitted for PT OT evaluation and possible placement.  Hospital course SLP consulted for concerns over aspiration/dysphagia. MBSS showed some esophageal  deficits resulting in a retrograde flow of all textures.  SLP recommended puréed diet with thin liquids also recommend double swallowing with every bite/sip.  Nutrition consulted and recommended adding Ensure drinks and pudding for supplements.  While inpatient, patient developed a UTI with RAINA with a creatinine of 1.9, poor urine output and urine lites suggestive intrinsic cause (fena 1.4%).  Due to her dementia, patient was not able to clarify any symptoms except for mild abdominal pain which resolved.  She was treated with IV hydration and started IV ceftriaxone 3/7 through 3/10, while awaiting urine culture results but it showed mixed growth x2 therefore switched to p.o. cefuroxime.  She was seen by family medicine who deemed her suitable for discharge.   3/14  Today patient is lying in bed, looking up at ceiling.  Does not make eye contact.  She did eventually answer to her name, she denied pain.  Per PCC appetite is fair to poor, 25-50%.   3/21  Patient sitting on side of bed for breakfast.  She still has her feet on the floor, upper 1/2 of her body is laying across the bed.  Is not speaking at this moment.   3/23 Patient looks better today, is more alert, she does answer some questions.  Appetite variable, request from family for appetite stimulant.  3/30  Patient arousable, but minimal to no conversation.  Appetite fair.  Does not appear in pain    Review of Systems:  Reviewed chart looking at current medications, treatment, labs and x-rays and note pertinent positives or negatives, otherwise 10 point system review negative except for what is noted in HPI.  Objective  VS: /71   Pulse 73   Temp 36.3 °C (97.3 °F)   Resp 17   Wt 50.1 kg (110 lb 8 oz)   SpO2 94%   BMI 22.32 kg/m²     Physical exam:   Physical Exam  HENT:      Head: Normocephalic.      Mouth/Throat:      Mouth: Mucous membranes are moist.   Cardiovascular:      Rate and Rhythm: Normal rate and regular rhythm.      Heart sounds: Murmur  heard.   Pulmonary:      Effort: Pulmonary effort is normal.      Breath sounds: Normal breath sounds.   Abdominal:      General: Bowel sounds are normal. There is no distension.      Palpations: Abdomen is soft.      Tenderness: There is no abdominal tenderness.   Musculoskeletal:      Right lower leg: No edema.      Left lower leg: No edema.   Skin:     General: Skin is warm and dry.   Neurological:      Mental Status: She is alert. Mental status is at baseline.        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 failure and stage 3b chronic kidney disease (CMS/HCC)  C/w Coreg, ASA, torsemide, hydralazine, isordil    Arteriosclerosis of aorta (CMS/HCC)  Monitor    Heart murmur, aortic  Monitor    Folic acid deficiency  C/w folic acid    Sinoatrial node dysfunction (CMS/HCC)  Has BiV CD    Failure to thrive in adult  Mirtazapine 7.5mg  and monitor, will increase as needed     Vitamin D deficiency  C/w vitamin D supplement     Vitamin B12 deficiency  C/w vitamin B12 supplement     LBBB (left bundle branch block)  Current BI-V/AICD     Subsequent non-ST elevation (NSTEMI) myocardial infarction (CMS/HCC)  C/w ASA, Coreg, Isosorbide, monitor labs     Chronic systolic heart failure (CMS/HCC)  C/w ASA, Coreg, torsemide, isosorbide, monitor labs     Stage 3b chronic kidney disease (CMS/HCC)  C/w hydralazine,      Iron deficiency anemia  C/w iron supplement       Electronically Signed By: ANEUDY Jones   4/10/23  8:32 PM

## 2023-04-01 VITALS
RESPIRATION RATE: 18 BRPM | SYSTOLIC BLOOD PRESSURE: 144 MMHG | BODY MASS INDEX: 22.62 KG/M2 | OXYGEN SATURATION: 95 % | DIASTOLIC BLOOD PRESSURE: 84 MMHG | HEART RATE: 72 BPM | WEIGHT: 112 LBS | TEMPERATURE: 97.8 F

## 2023-04-01 PROBLEM — R29.810 FACIAL DROOP: Status: ACTIVE | Noted: 2023-04-01

## 2023-04-01 PROBLEM — Z95.0 PRESENCE OF BIVENTRICULAR CARDIAC PACEMAKER: Status: ACTIVE | Noted: 2023-04-01

## 2023-04-01 PROBLEM — I50.22 CHRONIC SYSTOLIC HEART FAILURE (MULTI): Status: ACTIVE | Noted: 2023-03-23

## 2023-04-01 PROBLEM — F41.9 ANXIETY: Status: ACTIVE | Noted: 2023-04-01

## 2023-04-01 PROBLEM — R53.1 WEAKNESS: Status: ACTIVE | Noted: 2023-04-01

## 2023-04-01 PROBLEM — E53.8 VITAMIN B12 DEFICIENCY: Status: ACTIVE | Noted: 2023-04-01

## 2023-04-01 PROBLEM — I70.0 ARTERIOSCLEROSIS OF AORTA (CMS-HCC): Status: ACTIVE | Noted: 2023-04-01

## 2023-04-01 PROBLEM — E78.5 HLD (HYPERLIPIDEMIA): Status: ACTIVE | Noted: 2023-04-01

## 2023-04-01 PROBLEM — N18.30 CKD (CHRONIC KIDNEY DISEASE), STAGE III (MULTI): Status: ACTIVE | Noted: 2023-04-01

## 2023-04-01 PROBLEM — F17.210 NICOTINE DEPENDENCE, CIGARETTES, UNCOMPLICATED: Status: ACTIVE | Noted: 2023-04-01

## 2023-04-01 PROBLEM — I10 HTN (HYPERTENSION): Status: ACTIVE | Noted: 2023-04-01

## 2023-04-01 PROBLEM — I44.7 LBBB (LEFT BUNDLE BRANCH BLOCK): Status: ACTIVE | Noted: 2023-04-01

## 2023-04-01 PROBLEM — R41.3 MEMORY CHANGES: Status: ACTIVE | Noted: 2023-04-01

## 2023-04-01 PROBLEM — I22.2 SUBSEQUENT NON-ST ELEVATION (NSTEMI) MYOCARDIAL INFARCTION (MULTI): Status: ACTIVE | Noted: 2023-04-01

## 2023-04-01 PROBLEM — D64.9 ANEMIA: Status: ACTIVE | Noted: 2023-04-01

## 2023-04-01 PROBLEM — E55.9 VITAMIN D DEFICIENCY: Status: ACTIVE | Noted: 2023-04-01

## 2023-04-01 PROBLEM — F01.B0: Status: ACTIVE | Noted: 2023-04-01

## 2023-04-01 PROBLEM — Z85.038 HISTORY OF COLON CANCER: Status: ACTIVE | Noted: 2023-04-01

## 2023-04-01 PROBLEM — I50.9 CHF (CONGESTIVE HEART FAILURE) (MULTI): Status: ACTIVE | Noted: 2023-04-01

## 2023-04-01 PROBLEM — E53.8 FOLIC ACID DEFICIENCY: Status: ACTIVE | Noted: 2023-04-01

## 2023-04-01 PROBLEM — N18.4 STAGE 4 CHRONIC KIDNEY DISEASE (MULTI): Status: ACTIVE | Noted: 2023-04-01

## 2023-04-01 PROBLEM — D50.9 IRON DEFICIENCY ANEMIA: Status: ACTIVE | Noted: 2023-04-01

## 2023-04-01 RX ORDER — ACETAMINOPHEN, DEXTROMETHORPHAN HBR, DOXYLAMINE SUCCINATE, PHENYLEPHRINE HCL 650; 20; 12.5; 1 MG/30ML; MG/30ML; MG/30ML; MG/30ML
1 SOLUTION ORAL DAILY
COMMUNITY
Start: 2019-12-10

## 2023-04-01 RX ORDER — CHOLECALCIFEROL (VITAMIN D3) 25 MCG
1 TABLET ORAL DAILY
COMMUNITY

## 2023-04-01 RX ORDER — CARVEDILOL 12.5 MG/1
25 TABLET ORAL
COMMUNITY
Start: 2020-01-31 | End: 2023-04-01 | Stop reason: DRUGHIGH

## 2023-04-01 RX ORDER — ASPIRIN 81 MG/1
1 TABLET ORAL DAILY
COMMUNITY
Start: 2019-01-28

## 2023-04-01 RX ORDER — TORSEMIDE 10 MG/1
10 TABLET ORAL DAILY PRN
COMMUNITY

## 2023-04-01 RX ORDER — ISOSORBIDE DINITRATE 10 MG/1
1 TABLET ORAL 3 TIMES DAILY
COMMUNITY
Start: 2020-10-15

## 2023-04-01 NOTE — PROGRESS NOTES
Nursing Home Visit  Name: Yessi Knight  YOB: 1940  MRN: [id number]    Chief Complaint  Failure to thrive/ decreased appetite  Subjective  HPI:   82-year-old female with a history of HFpEF, AICD/pacemaker placement, CAD, CKD 3B, CVA and recurrent TIAs admitted to SNF after hospital stay for cognitive and functional decline.  Per hospital record patient presented to the ER due to concern for progressively worsening aphasia.  Patient unable to provide any history however per son who over the phone stated she has had progressively worsening speech over the last couple days.  He was concerned that she may be having a stroke prompting him to call 911.  He reports that she lives with him and that she has very limited mobility and is almost fully dependent for ADLs.  She does have home health care that visits daily.  Per chart review patient's son has a history of disability (details are unclear), he walks with a walker although he is able to prepare food for the patient and himself.  Per discussion with the patient's daughter who is POA she was unaware that the patient was in the ER until she spoke with the ER .  She has been working on getting patient placed in a long-term care facility due to significantly difficulties caring for her at home.  Reportedly patient's home health agencies or family friends were helping out, not a professional home health agency.  A BAT was called in ER due to patient concerns however per neurology evaluation there was no concern for acute stroke and CT head was negative for acute changes.  Due to the patient's significant cognitive and mobility impairments and concern for level of care she is that she is able to receive at home she was admitted for PT OT evaluation and possible placement.  Hospital course SLP consulted for concerns over aspiration/dysphagia. MBSS showed some esophageal deficits resulting in a retrograde flow of all textures.  SLP recommended  puréed diet with thin liquids also recommend double swallowing with every bite/sip.  Nutrition consulted and recommended adding Ensure drinks and pudding for supplements.  While inpatient, patient developed a UTI with RAINA with a creatinine of 1.9, poor urine output and urine lites suggestive intrinsic cause (fena 1.4%).  Due to her dementia, patient was not able to clarify any symptoms except for mild abdominal pain which resolved.  She was treated with IV hydration and started IV ceftriaxone 3/7 through 3/10, while awaiting urine culture results but it showed mixed growth x2 therefore switched to p.o. cefuroxime.  She was seen by family medicine who deemed her suitable for discharge.   3/14  Today patient is lying in bed, looking up at ceiling.  Does not make eye contact.  She did eventually answer to her name, she denied pain.  Per PCC appetite is fair to poor, 25-50%.   3/21  Patient sitting on side of bed for breakfast.  She still syed her feet on the floor, upper 1/2 of her body is laying across the bed.  Is not speaking at this moment.   3/23 Patient looks better today, is more alert, she does answer some questions.  Appetite variable, request from family for appetite stimulant   Review of Systems:  Reviewed chart looking at current medications, treatment, labs and x-rays and note pertinent positives or negatives, otherwise 10 point system review negative except for what is noted in HPI.  Objective  VS: /84   Pulse 72   Temp 36.6 °C (97.8 °F)   Resp 18   Wt 50.8 kg (112 lb)   SpO2 95%   BMI 22.62 kg/m²     Physical exam:   Physical Exam  HENT:      Head: Normocephalic.      Mouth/Throat:      Mouth: Mucous membranes are moist.   Cardiovascular:      Rate and Rhythm: Normal rate and regular rhythm.      Heart sounds: Murmur heard.   Pulmonary:      Effort: Pulmonary effort is normal.      Breath sounds: Normal breath sounds.   Abdominal:      General: Bowel sounds are normal. There is no distension.       Palpations: Abdomen is soft.      Tenderness: There is no abdominal tenderness.   Musculoskeletal:      Right lower leg: No edema.      Left lower leg: No edema.   Skin:     General: Skin is warm and dry.   Neurological:      Mental Status: She is alert. Mental status is at baseline.        Assessment/Plan   Failure to thrive in adult  Add Mirtazapine 7.5mg  and monitor, will increase as needed    Folic acid deficiency  C/w folic acid supplement    Vitamin D deficiency  C/w vitamin D supplement    Vitamin B12 deficiency  C/w vitamin B12 supplement    LBBB (left bundle branch block)  Current BI-V/AICD    Subsequent non-ST elevation (NSTEMI) myocardial infarction (CMS/HCC)  C/w ASA, Coreg, Isosorbide, monitor labs    Chronic systolic heart failure (CMS/HCC)  C/w ASA, Coreg, torsemide, isosorbide, monitor labs    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 congestive heart failure and stage 3b chronic kidney disease (CMS/HCC)  C/w hydralazine,     Iron deficiency anemia  C/w iron supplement

## 2023-04-03 ENCOUNTER — TELEPHONE (OUTPATIENT)
Dept: PRIMARY CARE | Facility: CLINIC | Age: 83
End: 2023-04-03

## 2023-04-04 PROBLEM — N30.00 ACUTE CYSTITIS WITHOUT HEMATURIA: Status: RESOLVED | Noted: 2023-03-23 | Resolved: 2023-04-04

## 2023-04-04 PROBLEM — R41.89 COGNITIVE DECLINE: Status: ACTIVE | Noted: 2023-04-04

## 2023-04-04 PROBLEM — M79.602 PAIN OF LEFT UPPER EXTREMITY: Status: ACTIVE | Noted: 2023-04-04

## 2023-04-04 PROBLEM — I25.10 CAD (CORONARY ARTERY DISEASE): Status: ACTIVE | Noted: 2023-04-04

## 2023-04-04 PROBLEM — I70.0 ARTERIOSCLEROSIS OF AORTA (CMS-HCC): Status: ACTIVE | Noted: 2023-04-04

## 2023-04-04 PROBLEM — G89.29 CHRONIC SHOULDER PAIN: Status: ACTIVE | Noted: 2023-04-04

## 2023-04-04 PROBLEM — I35.8 HEART MURMUR, AORTIC: Status: ACTIVE | Noted: 2023-04-04

## 2023-04-04 PROBLEM — R26.89 POOR BALANCE: Status: ACTIVE | Noted: 2023-04-04

## 2023-04-04 PROBLEM — I69.391 DYSPHAGIA AS LATE EFFECT OF CEREBROVASCULAR ACCIDENT (CVA): Status: RESOLVED | Noted: 2023-03-23 | Resolved: 2023-04-04

## 2023-04-04 PROBLEM — I69.320 APHASIA AS LATE EFFECT OF CEREBROVASCULAR ACCIDENT: Status: RESOLVED | Noted: 2023-03-23 | Resolved: 2023-04-04

## 2023-04-04 PROBLEM — M25.519 CHRONIC SHOULDER PAIN: Status: ACTIVE | Noted: 2023-04-04

## 2023-04-04 NOTE — ASSESSMENT & PLAN NOTE
Stable based on last GFR. Continue established treatment plan including avoidance of nephrotoxins and follow at least yearly.

## 2023-04-04 NOTE — ASSESSMENT & PLAN NOTE
Stable based on symptoms and exam. Continue established treatment plan and follow-up at least yearly.

## 2023-04-10 VITALS
WEIGHT: 110.5 LBS | BODY MASS INDEX: 22.32 KG/M2 | TEMPERATURE: 97.3 F | SYSTOLIC BLOOD PRESSURE: 142 MMHG | HEART RATE: 73 BPM | OXYGEN SATURATION: 94 % | DIASTOLIC BLOOD PRESSURE: 71 MMHG | RESPIRATION RATE: 17 BRPM

## 2023-04-10 PROBLEM — R62.7 FAILURE TO THRIVE IN ADULT: Status: RESOLVED | Noted: 2023-03-23 | Resolved: 2023-04-10

## 2023-04-10 NOTE — PROGRESS NOTES
Nursing Home Visit  Name: Yessi Knight  YOB: 1940  MRN: [id number]    Chief Complaint  Failure to thrive/ decreased appetite  Subjective  HPI:   82-year-old female with a history of HFpEF, AICD/pacemaker placement, CAD, CKD 3B, CVA and recurrent TIAs admitted to SNF after hospital stay for cognitive and functional decline.  Per hospital record patient presented to the ER due to concern for progressively worsening aphasia.  Patient unable to provide any history however per son who over the phone stated she has had progressively worsening speech over the last couple days.  He was concerned that she may be having a stroke prompting him to call 911.  He reports that she lives with him and that she has very limited mobility and is almost fully dependent for ADLs.  She does have home health care that visits daily.  Per chart review patient's son has a history of disability (details are unclear), he walks with a walker although he is able to prepare food for the patient and himself.  Per discussion with the patient's daughter who is POA she was unaware that the patient was in the ER until she spoke with the ER .  She has been working on getting patient placed in a long-term care facility due to significantly difficulties caring for her at home.  Reportedly patient's home health agencies or family friends were helping out, not a professional home health agency.  A BAT was called in ER due to patient concerns however per neurology evaluation there was no concern for acute stroke and CT head was negative for acute changes.  Due to the patient's significant cognitive and mobility impairments and concern for level of care she is that she is able to receive at home she was admitted for PT OT evaluation and possible placement.  Hospital course SLP consulted for concerns over aspiration/dysphagia. MBSS showed some esophageal deficits resulting in a retrograde flow of all textures.  SLP recommended  puréed diet with thin liquids also recommend double swallowing with every bite/sip.  Nutrition consulted and recommended adding Ensure drinks and pudding for supplements.  While inpatient, patient developed a UTI with RAINA with a creatinine of 1.9, poor urine output and urine lites suggestive intrinsic cause (fena 1.4%).  Due to her dementia, patient was not able to clarify any symptoms except for mild abdominal pain which resolved.  She was treated with IV hydration and started IV ceftriaxone 3/7 through 3/10, while awaiting urine culture results but it showed mixed growth x2 therefore switched to p.o. cefuroxime.  She was seen by family medicine who deemed her suitable for discharge.   3/14  Today patient is lying in bed, looking up at ceiling.  Does not make eye contact.  She did eventually answer to her name, she denied pain.  Per PCC appetite is fair to poor, 25-50%.   3/21  Patient sitting on side of bed for breakfast.  She still has her feet on the floor, upper 1/2 of her body is laying across the bed.  Is not speaking at this moment.   3/23 Patient looks better today, is more alert, she does answer some questions.  Appetite variable, request from family for appetite stimulant.  3/30  Patient arousable, but minimal to no conversation.  Appetite fair.  Does not appear in pain    Review of Systems:  Reviewed chart looking at current medications, treatment, labs and x-rays and note pertinent positives or negatives, otherwise 10 point system review negative except for what is noted in HPI.  Objective  VS: /71   Pulse 73   Temp 36.3 °C (97.3 °F)   Resp 17   Wt 50.1 kg (110 lb 8 oz)   SpO2 94%   BMI 22.32 kg/m²     Physical exam:   Physical Exam  HENT:      Head: Normocephalic.      Mouth/Throat:      Mouth: Mucous membranes are moist.   Cardiovascular:      Rate and Rhythm: Normal rate and regular rhythm.      Heart sounds: Murmur heard.   Pulmonary:      Effort: Pulmonary effort is normal.      Breath  sounds: Normal breath sounds.   Abdominal:      General: Bowel sounds are normal. There is no distension.      Palpations: Abdomen is soft.      Tenderness: There is no abdominal tenderness.   Musculoskeletal:      Right lower leg: No edema.      Left lower leg: No edema.   Skin:     General: Skin is warm and dry.   Neurological:      Mental Status: She is alert. Mental status is at baseline.        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 failure and stage 3b chronic kidney disease (CMS/HCC)  C/w Coreg, ASA, torsemide, hydralazine, isordil    Arteriosclerosis of aorta (CMS/[facility])  Monitor    Heart murmur, aortic  Monitor    Folic acid deficiency  C/w folic acid    Sinoatrial node dysfunction (CMS/[facility])  Has BiV CD    Failure to thrive in adult  Mirtazapine 7.5mg  and monitor, will increase as needed     Vitamin D deficiency  C/w vitamin D supplement     Vitamin B12 deficiency  C/w vitamin B12 supplement     LBBB (left bundle branch block)  Current BI-V/AICD     Subsequent non-ST elevation (NSTEMI) myocardial infarction (CMS/[facility])  C/w ASA, Coreg, Isosorbide, monitor labs     Chronic systolic heart failure (CMS/HCC)  C/w ASA, Coreg, torsemide, isosorbide, monitor labs     Stage 3b chronic kidney disease (CMS/[facility])  C/w hydralazine,      Iron deficiency anemia  C/w iron supplement

## 2023-04-24 ENCOUNTER — APPOINTMENT (OUTPATIENT)
Dept: PRIMARY CARE | Facility: CLINIC | Age: 83
End: 2023-04-24
Payer: MEDICARE

## 2023-05-22 ENCOUNTER — APPOINTMENT (OUTPATIENT)
Dept: PRIMARY CARE | Facility: CLINIC | Age: 83
End: 2023-05-22
Payer: MEDICARE

## 2023-07-06 LAB
ANION GAP IN SER/PLAS: 25 MMOL/L (ref 10–20)
CALCIUM (MG/DL) IN SER/PLAS: 11.6 MG/DL (ref 8.6–10.3)
CARBON DIOXIDE, TOTAL (MMOL/L) IN SER/PLAS: 15 MMOL/L (ref 21–32)
CHLORIDE (MMOL/L) IN SER/PLAS: 131 MMOL/L (ref 98–107)
CREATININE (MG/DL) IN SER/PLAS: 6.6 MG/DL (ref 0.5–1.05)
ERYTHROCYTE DISTRIBUTION WIDTH (RATIO) BY AUTOMATED COUNT: 16.1 % (ref 11.5–14.5)
ERYTHROCYTE MEAN CORPUSCULAR HEMOGLOBIN CONCENTRATION (G/DL) BY AUTOMATED: 27.1 G/DL (ref 32–36)
ERYTHROCYTE MEAN CORPUSCULAR VOLUME (FL) BY AUTOMATED COUNT: 100 FL (ref 80–100)
ERYTHROCYTES (10*6/UL) IN BLOOD BY AUTOMATED COUNT: 3.85 X10E12/L (ref 4–5.2)
GFR FEMALE: 6 ML/MIN/1.73M2
GLUCOSE (MG/DL) IN SER/PLAS: 87 MG/DL (ref 74–99)
HEMATOCRIT (%) IN BLOOD BY AUTOMATED COUNT: 38.4 % (ref 36–46)
HEMOGLOBIN (G/DL) IN BLOOD: 10.4 G/DL (ref 12–16)
LEUKOCYTES (10*3/UL) IN BLOOD BY AUTOMATED COUNT: 5.4 X10E9/L (ref 4.4–11.3)
PLATELETS (10*3/UL) IN BLOOD AUTOMATED COUNT: 133 X10E9/L (ref 150–450)
POTASSIUM (MMOL/L) IN SER/PLAS: 5 MMOL/L (ref 3.5–5.3)
SODIUM (MMOL/L) IN SER/PLAS: 166 MMOL/L (ref 136–145)
UREA NITROGEN (MG/DL) IN SER/PLAS: 137 MG/DL (ref 6–23)